# Patient Record
Sex: FEMALE
[De-identification: names, ages, dates, MRNs, and addresses within clinical notes are randomized per-mention and may not be internally consistent; named-entity substitution may affect disease eponyms.]

---

## 2019-02-11 ENCOUNTER — HOSPITAL ENCOUNTER (OUTPATIENT)
Dept: HOSPITAL 31 - C.EROB | Age: 34
Setting detail: OBSERVATION
LOS: 1 days | Discharge: HOME | End: 2019-02-12
Attending: OBSTETRICS & GYNECOLOGY | Admitting: OBSTETRICS & GYNECOLOGY
Payer: COMMERCIAL

## 2019-02-11 VITALS — BODY MASS INDEX: 31.9 KG/M2

## 2019-02-11 DIAGNOSIS — K29.70: ICD-10-CM

## 2019-02-11 DIAGNOSIS — Z3A.28: ICD-10-CM

## 2019-02-11 DIAGNOSIS — O99.613: Primary | ICD-10-CM

## 2019-02-11 LAB
ALBUMIN SERPL-MCNC: 3.6 G/DL (ref 3.5–5)
ALBUMIN/GLOB SERPL: 1.4 {RATIO} (ref 1–2.1)
ALT SERPL-CCNC: 20 U/L (ref 9–52)
AMYLASE SERPL-CCNC: 89 U/L (ref 30–110)
AST SERPL-CCNC: 25 U/L (ref 14–36)
BACTERIA #/AREA URNS HPF: (no result) /[HPF]
BASOPHILS # BLD AUTO: 0 K/UL (ref 0–0.2)
BASOPHILS NFR BLD: 0.2 % (ref 0–2)
BILIRUB UR-MCNC: NEGATIVE MG/DL
BUN SERPL-MCNC: 7 MG/DL (ref 7–17)
CALCIUM SERPL-MCNC: 8.9 MG/DL (ref 8.6–10.4)
EOSINOPHIL # BLD AUTO: 0 K/UL (ref 0–0.7)
EOSINOPHIL NFR BLD: 0.1 % (ref 0–4)
ERYTHROCYTE [DISTWIDTH] IN BLOOD BY AUTOMATED COUNT: 13.1 % (ref 11.5–14.5)
GFR NON-AFRICAN AMERICAN: > 60
GLUCOSE UR STRIP-MCNC: NORMAL MG/DL
HGB BLD-MCNC: 12.6 G/DL (ref 11–16)
LEUKOCYTE ESTERASE UR-ACNC: (no result) LEU/UL
LIPASE: 44 U/L (ref 23–300)
LYMPHOCYTE: 5 % (ref 20–40)
LYMPHOCYTES # BLD AUTO: 0.8 K/UL (ref 1–4.3)
LYMPHOCYTES NFR BLD AUTO: 7 % (ref 20–40)
MCH RBC QN AUTO: 31 PG (ref 27–31)
MCHC RBC AUTO-ENTMCNC: 34.5 G/DL (ref 33–37)
MCV RBC AUTO: 90 FL (ref 81–99)
MONOCYTE: 3 % (ref 0–10)
MONOCYTES # BLD: 0.3 K/UL (ref 0–0.8)
MONOCYTES NFR BLD: 2.6 % (ref 0–10)
NEUTROPHILS # BLD: 10.4 K/UL (ref 1.8–7)
NEUTROPHILS NFR BLD AUTO: 90.1 % (ref 50–75)
NEUTROPHILS NFR BLD AUTO: 92 % (ref 50–75)
NRBC BLD AUTO-RTO: 0 % (ref 0–2)
PH UR STRIP: 7 [PH] (ref 5–8)
PLATELET # BLD EST: NORMAL 10*3/UL
PLATELET # BLD: 166 K/UL (ref 130–400)
PMV BLD AUTO: 9.5 FL (ref 7.2–11.7)
PROT UR STRIP-MCNC: NEGATIVE MG/DL
RBC # BLD AUTO: 4.06 MIL/UL (ref 3.8–5.2)
RBC # UR STRIP: NEGATIVE /UL
RBC MORPH BLD: NORMAL
SP GR UR STRIP: 1.01 (ref 1–1.03)
SQUAMOUS EPITHIAL: 3 /HPF (ref 0–5)
TOTAL CELLS COUNTED BLD: 100
UROBILINOGEN UR-MCNC: NORMAL MG/DL (ref 0.2–1)
WBC # BLD AUTO: 11.5 K/UL (ref 4.8–10.8)

## 2019-02-11 PROCEDURE — 80053 COMPREHEN METABOLIC PANEL: CPT

## 2019-02-11 PROCEDURE — 82150 ASSAY OF AMYLASE: CPT

## 2019-02-11 PROCEDURE — 83690 ASSAY OF LIPASE: CPT

## 2019-02-11 PROCEDURE — 85025 COMPLETE CBC W/AUTO DIFF WBC: CPT

## 2019-02-11 PROCEDURE — 96375 TX/PRO/DX INJ NEW DRUG ADDON: CPT

## 2019-02-11 PROCEDURE — 76700 US EXAM ABDOM COMPLETE: CPT

## 2019-02-11 PROCEDURE — 76818 FETAL BIOPHYS PROFILE W/NST: CPT

## 2019-02-11 PROCEDURE — 81001 URINALYSIS AUTO W/SCOPE: CPT

## 2019-02-11 PROCEDURE — 96374 THER/PROPH/DIAG INJ IV PUSH: CPT

## 2019-02-11 PROCEDURE — 76815 OB US LIMITED FETUS(S): CPT

## 2019-02-11 NOTE — US
HISTORY:

r/o appendicitis, RUQ pain in pregnancy



COMPARISON:

None available.



TECHNIQUE:

Sonographic evaluation of the abdomen.



FINDINGS:



LIVER:

Measures 19.7 cm in sagittal dimension. Echogenic liver may be seen 

in setting of hepatic parenchymal disease or fatty infiltration.  No 

focal hepatic mass identified. The main portal vein appears patent 

with normal directional flow.   No intrahepatic bile duct dilatation.



GALLBLADDER:

Cholecystectomy.



COMMON BILE DUCT:

Measures 7 mm. 



PANCREAS:

Not well visualized.



RIGHT KIDNEY:

Measures 12.7 x 4.9 x 6.0cm.  Mild right hydronephrosis.  No 

obstructing calculus. 



LEFT KIDNEY:

Measures 12.3 x 5.1 x 4.5cm.  No obstructing calculus or 

hydronephrosis identified. 



SPLEEN:

Measures approximately 13.0 cm. 



AORTA:

Limited views appear unremarkable. 



IVC:

Limited views appear unremarkable. 



OTHER FINDINGS:

Partially imaged intrauterine pregnancy.  Please note that study was 

not performed for evaluation of the fetus; if indicated suggest 

further evaluation with pelvic ultrasound.  



IMPRESSION:

Mild right-sided hydronephrosis.



Mild hepatomegaly. Echogenic liver may be seen in setting of hepatic 

parenchymal disease or fatty infiltration. 



Mildly dilated common bile duct in the setting of cholecystectomy. 



Partially imaged intrauterine pregnancy.  Please note that study was 

not performed for evaluation of the fetus; if indicated suggest 

further evaluation with pelvic ultrasound.

## 2019-02-11 NOTE — OBPN
===========================

Datetime: 2019 18:34

===========================

   

IP Progress Note Comment:  pt seen adn examiend wtih RUQ intermitten pain radiating, with some relief
t after enema with bowel movemtns. pt preorts vomitign x 2, intermittend nause, with chills, no fever
,. pt jessy any cought, sore throat, cp, sob, dysuria, urgency, frequency. pt rperots pain /10.

      

   VS see above

   pe see above

      

    @ 28 wks with abodmina pain, inractable vomiting r/o gastrtis vs appendicitys

   -s/p labs

   -UA: 2+ ketones: continuve IV hdyration, pt not able to toerated po

   -diet: ice chips

   -Pain mangmetn: IV toenol

   -s/p Enema

   -s/p US result reviewd with paeitn: advised appendiix not vilauzed

   -pt advised on possible MRI with or withoruh contrast and wudl liek to wait for now, given stable 
wbc, stable clinical exam

   -reglan prn

   

===========================

Datetime: 2019 14:16

===========================

   

Membranes, Provider:  Intact

Contraction Comments Provider:  irritablity

FHR - Baseline A Provider:  145

Gestation - Est Wks by US:  28.0

Fetal Presentation-Admit:  Vertex

Vital Signs Provider:  Reviewed; Within Normal Limits

FHR Category Provider Fetus A:  Category I

NICHD Variability Prov Fetus A:  Moderate 6-25bpm

Dilatation, Provider:  0

NICHD Decel Fetus A IP Provider:  None

## 2019-02-11 NOTE — OBHP
===========================

Datetime: 2019 14:16

===========================

   

IP Adm Impression:  , intrauterine pregnancy

Admit Comment, IP Provider:   @ 28 wks GA c/o new onset RLQ to RUQ pain x 1 day that started this
 mornign while resting, followedby nause and non bloody, non bilious vomitign x x 3 epsidoes .tp preo
rts the pain is mainly now RUQ 6/10 sharp and stabbign, similar to when she had her gallbaldder roemv
ed years ago. pt reprots sick contact of children over the weekend. pt deneis any current nause or ov
miting, cp, sob, dizynes. pt preorts conspation, last bm thi smornign with starinign and still feels 
"gas type discomfort. 

      

   OB: P0 uncomplted

   GYN: Denies

   PMH: denies

   PSH: cholecstyceotmy

   FHX: non contibry

   MEDS: PNV

   SHX: negaive etoch/tobacco/drugs

   NKDA

      

   A/P  @ 28 wks GA with abdomininal pain/gastritis

   -Labs; CBC, CMP, ayplase , lipase, UA

   -NPO

   -IVH: LR @ 125cc/hr

   -Zofran

   -Cont toco adn efm

      

   Pt reevated with ovmtiign g x 1

   For Abdomisnl US adivsed r/o appendicitis, ? gallstone, ptl, gastritis, etc

   R queen

Pelvic Type - PN:  Adequate

Extremities - PN:  Normal

Abdomen - PN:  Normal

Back - PN:  Normal

Breast - PN:  Normal

Lungs - PN:  Normal

Heart - PN:  Normal

Thyroid - PN:  Not Done

Neurologic - PN:  Normal

HEENT - PN:  Normal

General - PN:  Normal

Fetal Presentation-Admit:  Vertex

FHR - Baseline A Provider:  145

Membranes, Provider:  Intact

Contraction Comments Provider:  irritablity

Comments, ACOG Physical Exam:  GEN NAD AAO x 3

   RESP: CTab/l

   CVS: RRR< +S1/S2

   ABD: soft, TTP RUQ, poin tnednerr, no guaridn, no reboud ntendnere, no rigidty, +BS, no paplale ct
x

   BACK negative cva 

   VE: closed

      

Gestation - Est Wks by US:  28.0

IP Prenatal Hx Assessment:  The Prenatal History has been Reviewed and is Current

EGA AdmitDate IP:  28.0

Vital Signs Provider:  Reviewed; Within Normal Limits

IP Chief Complaint:  Illness

NICHD Variability Prov Fetus A:  Moderate 6-25bpm

FHR Category Provider Fetus A:  Category I

NICHD Decel Fetus A IP Provider:  None

Dilatation, Provider:  0

Genitourinary Exam:  Normal

DTRs - PN:  Normal

## 2019-02-12 LAB
ALBUMIN SERPL-MCNC: 3.5 G/DL (ref 3.5–5)
ALBUMIN/GLOB SERPL: 1.4 {RATIO} (ref 1–2.1)
ALT SERPL-CCNC: 19 U/L (ref 9–52)
AMYLASE SERPL-CCNC: 73 U/L (ref 30–110)
AST SERPL-CCNC: 23 U/L (ref 14–36)
BACTERIA #/AREA URNS HPF: (no result) /[HPF]
BASOPHILS # BLD AUTO: 0.1 K/UL (ref 0–0.2)
BASOPHILS NFR BLD: 0.9 % (ref 0–2)
BILIRUB UR-MCNC: NEGATIVE MG/DL
BUN SERPL-MCNC: 5 MG/DL (ref 7–17)
CALCIUM SERPL-MCNC: 8.7 MG/DL (ref 8.6–10.4)
COLOR UR: YELLOW
EOSINOPHIL # BLD AUTO: 0.1 K/UL (ref 0–0.7)
EOSINOPHIL NFR BLD: 0.6 % (ref 0–4)
ERYTHROCYTE [DISTWIDTH] IN BLOOD BY AUTOMATED COUNT: 13.1 % (ref 11.5–14.5)
GFR NON-AFRICAN AMERICAN: > 60
GLUCOSE UR STRIP-MCNC: NEGATIVE MG/DL
HGB BLD-MCNC: 11.7 G/DL (ref 11–16)
LIPASE: 58 U/L (ref 23–300)
LYMPHOCYTES # BLD AUTO: 1.7 K/UL (ref 1–4.3)
LYMPHOCYTES NFR BLD AUTO: 17.7 % (ref 20–40)
MCH RBC QN AUTO: 31 PG (ref 27–31)
MCHC RBC AUTO-ENTMCNC: 34.4 G/DL (ref 33–37)
MCV RBC AUTO: 90.2 FL (ref 81–99)
MONOCYTES # BLD: 0.6 K/UL (ref 0–0.8)
MONOCYTES NFR BLD: 6 % (ref 0–10)
NEUTROPHILS # BLD: 7.2 K/UL (ref 1.8–7)
NEUTROPHILS NFR BLD AUTO: 74.8 % (ref 50–75)
NRBC BLD AUTO-RTO: 0 % (ref 0–2)
PH UR STRIP: 7.5 [PH] (ref 5–8)
PLATELET # BLD: 181 K/UL (ref 130–400)
PMV BLD AUTO: 9.1 FL (ref 7.2–11.7)
PROT UR STRIP-MCNC: NEGATIVE MG/DL
RBC # BLD AUTO: 3.77 MIL/UL (ref 3.8–5.2)
RBC # UR STRIP: (no result) /UL
SP GR UR STRIP: 1.01 (ref 1–1.03)
SQUAMOUS EPITHIAL: 3 /HPF (ref 0–5)
UROBILINOGEN UR-MCNC: 0.2 MG/DL (ref 0.2–1)
WBC # BLD AUTO: 9.6 K/UL (ref 4.8–10.8)

## 2019-02-12 NOTE — OBPN
===========================

Datetime: 2019 09:18

===========================

   

Contraction Comments Provider:  none

FHR - Baseline A Provider:  150

Gestation - Est Wks by US:  28.1

Fetal Presentation-Admit:  Vertex

IP Progress Note Comment:  pt seen and examined and rpeorts pain has imroved. pt no longer reports an
y nause or vomting, tolerating regular diet, not no fever, chills

   VSS

   PE see above

      

   A/P  @ 28.1 wks GA with gastritis 

   s/p repeta labs

   reular diet

   s/p US BPP reviwed

   dc home

   RTO 

   preciaotn givne

Vital Signs Provider:  Reviewed; Within Normal Limits

NICHD Variability Prov Fetus A:  Moderate 6-25bpm

Dilatation, Provider:  0

NICHD Decel Fetus A IP Provider:  None

## 2019-02-12 NOTE — US
PROCEDURE:  



HISTORY:

LMP 2018 pregnancy-pain 



COMPARISON:

No pregnancy related studies.  A abdominal ultrasound study from 

2019 is noted



TECHNIQUE:

Transabdominal scanning of the maternal pelvis and a 2nd/ 3rd 

trimester pregnancy with image documentation



FINDINGS:

Fetus: Single intrauterine gestation 



Fetal heart rate: Present at 146 beats per minute 



Fetal presentation: Cephalic



Placenta:  Anteriorwithout previa or abruption greater than 2 cm 

clear from the cervix 



Amniotic fluid : Normal appearin.6



Fetal anatomy: Limited due to late gestation. Cardiac fetal anatomy 

particularly limited 



Fetal biometrics: 



Gestational age by ultrasound: 29 weeks 0 days +/-2 weeks 0 days



Estimated Fetal weight:  1304g +/-196 g 



Maternal factors: 



Uterus: Unremarkable. No myometrial masses 



Cervix:3.9 cm length and closed appearing 



Free fluid: None 



Biophysical profile: 



Breathin 



Gross body movements: 2 



Limb tone: 2 



Amniotic Fluid: 2 



Total biophysical profile:  



IMPRESSION:

Single intrauterine gestation with normal cardiac activity. 

Ultrasound age 29 weeks 0 days +/-2 weeks 0 days.. Clinical dates 29 

weeks 2 days. Fetal presentation - cephalic. No previa .



Biophysical profile